# Patient Record
Sex: MALE | Employment: FULL TIME | URBAN - METROPOLITAN AREA
[De-identification: names, ages, dates, MRNs, and addresses within clinical notes are randomized per-mention and may not be internally consistent; named-entity substitution may affect disease eponyms.]

---

## 2023-10-11 ENCOUNTER — HOSPITAL ENCOUNTER (OUTPATIENT)
Dept: PHYSICAL THERAPY | Age: 23
Setting detail: THERAPIES SERIES
Discharge: HOME | End: 2023-10-11
Payer: COMMERCIAL

## 2023-10-11 PROCEDURE — 97110 THERAPEUTIC EXERCISES: CPT

## 2023-10-11 PROCEDURE — 97162 PT EVAL MOD COMPLEX 30 MIN: CPT

## 2023-10-11 ASSESSMENT — PAIN SCALES - GENERAL: PAINLEVEL_OUTOF10: 4

## 2023-10-11 NOTE — PLAN OF CARE
Outpatient Physical Therapy           [place]           [x] Phone: [phone]   Fax: [phone]  [facility]           [] Phone: [phone]   Fax: [phone]     To: Serafin Mcgowan PA-C     From: Alfredo Gill, PT, DPT, Cert. DN      Patient: Ismael Richmond       : 2000  Diagnosis: Strain of muscle, fascia and tendon of lower back, initial encounter [L52.279K]    Treatment Diagnosis: low back pain  Date: 10/11/2023    Physical Therapy Certification/Re-Certification Form  Dear Serafin Mcgwoan PA-C,   The following patient has been evaluated for physical therapy services and for therapy to continue, insurance requires physician review of the treatment plan initially and every 90 days. Please review the attached evaluation and/or summary of the patient's plan of care, and verify that you agree therapy should continue by signing the attached document and sending it back to our office. Assessment:    Assessment: pt is a 25 yo male that presents to therapy with complaints of L sided lumbar pain following an injury at work with lifting heavy objects. he works at [employer] full time and has to do a lot of heavy lifting and mobility while holding heavy objects. he is currently on light duty. pt can only sit for 10 minutes in his patrick chair without having increased pain and at 30 minutes it is unbearable, which also makes traveling difficult. pt demo impaired LLE strength, lumbar ROM, activity tolerance, functional mobility, increased pain, sitting tolerance and work tolerance. Pt would benefit from continued skilled physical therapy to address impairments and limitations, progress toward goal completion, promote independence with ADLs, and prevent further injury. Patient agrees with established plan of care and assisted in the development of their short term and long term goals. Patient had no adverse reaction with initial treatment and there are no barriers to learning.   Learning preferences

## 2023-10-11 NOTE — FLOWSHEET NOTE
Outpatient Physical Therapy  [place]           [x] Phone: [phone]   Fax: [phone]  [facility]           [] Phone: [phone]   Fax: [phone]        Physical Therapy Daily Treatment Note  Date:  10/11/2023    Patient Name:  Renetta Ortega    :  2000  MRN: [id number]  Restrictions/Precautions: Restrictions/Precautions: General Precautions        Diagnosis:   Strain of muscle, fascia and tendon of lower back, initial encounter [M24.493V]    Date of Injury/Surgery:   Treatment Diagnosis:  low back pain  Insurance/Certification information: [facility]- 10 visits by 11/15/23  Referring Physician:  Moiess Rader PA-C     PCP: Yohana, Pcp  Next Doctor Visit:    Plan of care signed (Y/N):  sent 10/11  Outcome Measure: oswestry:   Visit# / total visits:   1/10  Pain level: 4/10   Goals:     Patient goals: reduce pain  Short term goals  Time Frame for Short term goals: refer to [facility]     Long Term Goals  Time Frame for Long Term Goals: 10 visits  Pt will report overall improvement in condition by 50% or more  pt will improve oswestry to 8/50 or less to show [facility] and subjective improvement  pt will improve L knee extension strength to 5/5 for improved body mechanics with lifting at work  pt will be able to sit for 30 minutes without having an increase in pain to be able to travel and for patrick  pt will report minimal to no pain with work activities to be able to return to full duty      Summary of Evaluation:  Assessment: pt is a 25 yo male that presents to therapy with complaints of L sided lumbar pain following an injury at work with lifting heavy objects. he works at [employer] full time and has to do a lot of heavy lifting and mobility while holding heavy objects. he is currently on light duty. pt can only sit for 10 minutes in his patrick chair without having increased pain and at 30 minutes it is unbearable, which also makes traveling difficult.  pt demo impaired LLE strength, lumbar ROM, activity

## 2023-10-11 NOTE — PROGRESS NOTES
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  Leisure & Hobbies: patrick    Prior Level of Function:  indep          Current Level of Function:  indep with increased pain      ADL Assistance: Independent  Homemaking Assistance: Independent  Ambulation Assistance: Independent  Transfer Assistance: Independent    OBJECTIVE EXAMINATION   Restrictions:  Restrictions/Precautions: General Precautions          Review of Systems:  Follows Commands: Within Functional Limits      Regional Screen:   SIJ Screen: WNL         Palpation:   Lumbar Spine Palpation: TTP L paraspinals        Left Strength  Right Strength         Strength LLE  L Hip Flexion: 5/5  L Hip ABduction: 4+/5  L Hip ADduction: 4+/5  L Knee Flexion: 5/5  L Knee Extension: 4+/5    Strength RLE  R Hip Flexion: 5/5  R Hip ABduction: 4+/5  R Hip ADduction: 4+/5  R Knee Flexion: 5/5  R Knee Extension: 5/5         Lumbar Assessment   AROM Lumbar Spine   Measured as: % of normal  Flexion: 10- bends more towards the R with forward flexion  Extension: 90  Lateral Flexion Right: 90  Lateral Flexion Left: 90  Right Rotation: 100  Left Rotation: 100              Special Tests:   Special Tests Lumbar Spine  Slump Test: R (-), L (+) (causes increase in pain in L sided lower back. no radiating)         ASSESSMENT     Impression: Assessment: pt is a 27 yo male that presents to therapy with complaints of L sided lumbar pain following an injury at work with lifting heavy objects.  he works at [employer] full time and has to do a lot of heavy lifting and

## 2023-10-20 ENCOUNTER — HOSPITAL ENCOUNTER (OUTPATIENT)
Dept: PHYSICAL THERAPY | Age: 23
Setting detail: THERAPIES SERIES
Discharge: HOME | End: 2023-10-20
Payer: COMMERCIAL

## 2023-10-20 PROCEDURE — 97110 THERAPEUTIC EXERCISES: CPT

## 2023-10-20 PROCEDURE — 97140 MANUAL THERAPY 1/> REGIONS: CPT

## 2023-10-20 NOTE — FLOWSHEET NOTE
Outpatient Physical Therapy  [place]           [x] Phone: [phone]   Fax: [phone]  [facility]           [] Phone: [phone]   Fax: [phone]        Physical Therapy Daily Treatment Note  Date:  10/20/2023    Patient Name:  Leontine Kanner    :  2000  MRN: [id number]  Restrictions/Precautions: Restrictions/Precautions: General Precautions        Diagnosis:   Strain of muscle, fascia and tendon of lower back, initial encounter [Y45.654Q]    Date of Injury/Surgery:   Treatment Diagnosis:  low back pain  Insurance/Certification information: [facility]- 10 visits by 11/15/23  Referring Physician:  Tennille Ricci PA-C     PCP: No, Pcp  Next Doctor Visit:    Plan of care signed (Y/N):  sent 10/11  Outcome Measure: oswestry:   Visit# / total visits:   2/10  Pain level: 4/10   Goals:     Patient goals: reduce pain  Short term goals  Time Frame for Short term goals: refer to [facility]     Long Term Goals  Time Frame for Long Term Goals: 10 visits  Pt will report overall improvement in condition by 50% or more  pt will improve oswestry to 8/50 or less to show [facility] and subjective improvement  pt will improve L knee extension strength to 5/5 for improved body mechanics with lifting at work  pt will be able to sit for 30 minutes without having an increase in pain to be able to travel and for patrick  pt will report minimal to no pain with work activities to be able to return to full duty      Summary of Evaluation:  Assessment: pt is a 25 yo male that presents to therapy with complaints of L sided lumbar pain following an injury at work with lifting heavy objects. he works at [employer] full time and has to do a lot of heavy lifting and mobility while holding heavy objects. he is currently on light duty. pt can only sit for 10 minutes in his patrick chair without having increased pain and at 30 minutes it is unbearable, which also makes traveling difficult.  pt demo impaired LLE strength, lumbar ROM, activity

## 2023-10-23 ENCOUNTER — HOSPITAL ENCOUNTER (OUTPATIENT)
Dept: MRI IMAGING | Age: 23
Discharge: HOME | End: 2023-10-23
Payer: COMMERCIAL

## 2023-10-23 DIAGNOSIS — S39.012A STRAIN OF MUSCLE, FASCIA AND TENDON OF LOWER BACK, INITIAL ENCOUNTER: ICD-10-CM

## 2023-10-23 PROCEDURE — 72148 MRI LUMBAR SPINE W/O DYE: CPT
